# Patient Record
Sex: MALE | Race: BLACK OR AFRICAN AMERICAN | Employment: FULL TIME | URBAN - METROPOLITAN AREA
[De-identification: names, ages, dates, MRNs, and addresses within clinical notes are randomized per-mention and may not be internally consistent; named-entity substitution may affect disease eponyms.]

---

## 2020-03-04 ENCOUNTER — APPOINTMENT (OUTPATIENT)
Dept: GENERAL RADIOLOGY | Facility: CLINIC | Age: 30
End: 2020-03-04
Attending: EMERGENCY MEDICINE
Payer: COMMERCIAL

## 2020-03-04 ENCOUNTER — HOSPITAL ENCOUNTER (EMERGENCY)
Facility: CLINIC | Age: 30
Discharge: HOME | End: 2020-03-04
Attending: NURSE PRACTITIONER | Admitting: NURSE PRACTITIONER
Payer: COMMERCIAL

## 2020-03-04 VITALS
WEIGHT: 150 LBS | HEART RATE: 82 BPM | DIASTOLIC BLOOD PRESSURE: 54 MMHG | SYSTOLIC BLOOD PRESSURE: 148 MMHG | OXYGEN SATURATION: 99 % | RESPIRATION RATE: 18 BRPM | TEMPERATURE: 98.2 F | HEIGHT: 66 IN | BODY MASS INDEX: 24.11 KG/M2

## 2020-03-04 DIAGNOSIS — S62.308A CLOSED FRACTURE OF 5TH METACARPAL: ICD-10-CM

## 2020-03-04 PROCEDURE — 73130 X-RAY EXAM OF HAND: CPT | Mod: LT

## 2020-03-04 PROCEDURE — 26600 TREAT METACARPAL FRACTURE: CPT | Mod: LT

## 2020-03-04 PROCEDURE — 99284 EMERGENCY DEPT VISIT MOD MDM: CPT | Mod: 25

## 2020-03-04 ASSESSMENT — MIFFLIN-ST. JEOR: SCORE: 1588.15

## 2020-03-04 NOTE — LETTER
March 4, 2020      To Whom It May Concern:      Emiliano Winter was seen in our Emergency Department today, 03/04/20.  Emiliano will need to not use his left hand at work until seen for follow-up with orthopedics in 3-5 days and must keep the splint dry.   He may return to work when improved.    Sincerely,        Aleah Chacko, CNP

## 2020-03-04 NOTE — ED AVS SNAPSHOT
Emergency Department  [address] [place] [address]  Phone:  [phone]  Fax:  [phone]                                    Emiliano Winter   MRN: [id number]    Department:   Emergency Department   Date of Visit:  3/4/2020           After Visit Summary Signature Page    I have received my discharge instructions, and my questions have been answered. I have discussed any challenges I see with this plan with the nurse or doctor.    ..........................................................................................................................................  Patient/Patient Representative Signature      ..........................................................................................................................................  Patient Representative Print Name and Relationship to Patient    ..................................................               ................................................  Date                                   Time    ..........................................................................................................................................  Reviewed by Signature/Title    ...................................................              ..............................................  Date                                               Time          22EPIC Rev 08/18

## 2020-03-05 ASSESSMENT — ENCOUNTER SYMPTOMS
JOINT SWELLING: 1
WOUND: 0
SHORTNESS OF BREATH: 0
ARTHRALGIAS: 1
ABDOMINAL PAIN: 0
WEAKNESS: 0
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
## 2020-03-09 ENCOUNTER — ANCILLARY PROCEDURE (OUTPATIENT)
Dept: GENERAL RADIOLOGY | Facility: CLINIC | Age: 30
End: 2020-03-09
Attending: ORTHOPAEDIC SURGERY
Payer: COMMERCIAL

## 2020-03-09 ENCOUNTER — OFFICE VISIT (OUTPATIENT)
Dept: ORTHOPEDICS | Facility: CLINIC | Age: 30
End: 2020-03-09
Payer: COMMERCIAL

## 2020-03-09 ENCOUNTER — PRE VISIT (OUTPATIENT)
Dept: ORTHOPEDICS | Facility: CLINIC | Age: 30
End: 2020-03-09

## 2020-03-09 DIAGNOSIS — S62.397A OTHER FRACTURE OF FIFTH METACARPAL BONE, LEFT HAND, INITIAL ENCOUNTER FOR CLOSED FRACTURE: Primary | ICD-10-CM

## 2020-03-09 DIAGNOSIS — S62.307A: ICD-10-CM

## 2020-03-09 DIAGNOSIS — S62.307A: Primary | ICD-10-CM
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
## 2020-03-09 NOTE — TELEPHONE ENCOUNTER
DIAGNOSIS: 5th metacarpal fx seen ED   APPOINTMENT DATE: 3.9.2020   NOTES STATUS DETAILS   OFFICE NOTE from referring provider N/A    OFFICE NOTE from other specialist N/A    DISCHARGE SUMMARY from hospital N/A    DISCHARGE REPORT from the ER Internal 3.4.2020 [facility]   OPERATIVE REPORT N/A    MEDICATION LIST Internal    IMPLANT RECORD/STICKER N/A    LABS     CBC/DIFF N/A    CULTURES N/A    INJECTIONS DONE IN RADIOLOGY N/A    MRI N/A    CT SCAN N/A    XRAYS (IMAGES & REPORTS) Internal 3.4.2020 left hand   TUMOR     PATHOLOGY  Slides & report N/A
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
## 2020-03-09 NOTE — LETTER
Date:March 10, 2020      Patient was self referred, no letter generated. Do not send.        [facility] Information

## 2020-03-28 ENCOUNTER — HOSPITAL ENCOUNTER (EMERGENCY)
Facility: CLINIC | Age: 30
Discharge: HOME | End: 2020-03-28
Attending: NURSE PRACTITIONER | Admitting: NURSE PRACTITIONER
Payer: COMMERCIAL

## 2020-03-28 VITALS — RESPIRATION RATE: 16 BRPM | OXYGEN SATURATION: 98 % | TEMPERATURE: 99.7 F

## 2020-03-28 DIAGNOSIS — J02.0 STREPTOCOCCAL PHARYNGITIS: ICD-10-CM

## 2020-03-28 LAB
DEPRECATED S PYO AG THROAT QL EIA: POSITIVE
SPECIMEN SOURCE: ABNORMAL

## 2020-03-28 PROCEDURE — 87880 STREP A ASSAY W/OPTIC: CPT | Performed by: NURSE PRACTITIONER

## 2020-03-28 PROCEDURE — 99283 EMERGENCY DEPT VISIT LOW MDM: CPT

## 2020-03-28 RX ORDER — ALBUTEROL SULFATE 90 UG/1
2 AEROSOL, METERED RESPIRATORY (INHALATION)
COMMUNITY
Start: 2019-08-27

## 2020-03-28 RX ORDER — PENICILLIN V POTASSIUM 500 MG/1
500 TABLET, FILM COATED ORAL 4 TIMES DAILY
Qty: 40 TABLET | Refills: 0 | Status: SHIPPED | OUTPATIENT
Start: 2020-03-28 | End: 2020-04-07

## 2020-03-28 ASSESSMENT — ENCOUNTER SYMPTOMS
CHILLS: 0
SHORTNESS OF BREATH: 0
NECK PAIN: 0
TROUBLE SWALLOWING: 1
DYSURIA: 0
COUGH: 0
FEVER: 0
FACIAL SWELLING: 0
HEADACHES: 0
SORE THROAT: 1
VOICE CHANGE: 0
EYES NEGATIVE: 1
ABDOMINAL PAIN: 0
RHINORRHEA: 0
FATIGUE: 0
BACK PAIN: 0

## 2020-03-28 NOTE — ED AVS SNAPSHOT
Emergency Department  [address] [place] [address]  Phone:  [phone]  Fax:  [phone]                                    Emiliano Winter   MRN: [id number]    Department:   Emergency Department   Date of Visit:  3/28/2020           After Visit Summary Signature Page    I have received my discharge instructions, and my questions have been answered. I have discussed any challenges I see with this plan with the nurse or doctor.    ..........................................................................................................................................  Patient/Patient Representative Signature      ..........................................................................................................................................  Patient Representative Print Name and Relationship to Patient    ..................................................               ................................................  Date                                   Time    ..........................................................................................................................................  Reviewed by Signature/Title    ...................................................              ..............................................  Date                                               Time          22EPIC Rev 08/18

## 2020-03-28 NOTE — LETTER
March 28, 2020      To Whom It May Concern:      Emiliano Winter was seen in our Emergency Department today, 03/28/20.  Please excuse Emiliano from work on 3/28 and 3/29 due to illness. He may return to work when improved.    Sincerely,        Aleah Chacko, CNP

## 2020-03-28 NOTE — ED PROVIDER NOTES
History   Chief Complaint:  Sore Throat     HPI   Emiliano Winter is a 29 year old male who presents with sore throat.  Emiliano notes he developed a mild sore throat 3 days ago which has worsened to today.  He denies fevers but has not checked his temperature.  He states he has associated difficulty swallowing due to sore throat and cervical adenopathy.  He denies headache, nuchal rigidity, cough, shortness of breath, congestion, abdominal pain, vomiting/diarrhea.  He has taken no medication for his symptoms.  The patient works at [employer] and is unsure of his sick contacts, but does not know of exposure to patients with COVID or PUI.  No other sick family members but states his child had strep throat 2 weeks ago.     Allergies:  No known drug allergies    Medications:   Advair Diskus  Albuterol inhaler    Past Medical History:    Mild persistent asthma  Chlamydia trachomatis infection  HSV-2 infection    Past Surgical History:    History reviewed. No pertinent surgical history.    Family History:    History reviewed. No pertinent family history.     Social History:  Smoking status: Current some day smoker  Alcohol use: No    Review of Systems   Constitutional: Negative for chills, fatigue and fever.   HENT: Positive for sore throat and trouble swallowing. Negative for congestion, ear pain, facial swelling, rhinorrhea, sneezing, tinnitus and voice change.    Eyes: Negative.    Respiratory: Negative for cough and shortness of breath.    Cardiovascular: Negative for chest pain.   Gastrointestinal: Negative for abdominal pain.   Genitourinary: Negative for discharge and dysuria.   Musculoskeletal: Negative for back pain and neck pain.   Neurological: Negative for headaches.   All other systems reviewed and are negative.      Physical Exam     Patient Vitals for the past 24 hrs:   Temp Temp src Heart Rate Resp SpO2   03/28/20 1347 99.7  F (37.6  C) Oral 118 16 98 %     Physical Exam  Nursing notes reviewed. Vitals  reviewed.  General: Alert. Well kept.  Eyes:  Conjunctiva non-injected, non-icteric.  Neck/Throat: Moist mucous membranes. Normal voice. Tonsils 2+ bilateral with erythema and no exudate.  Uvula midline.  No trismus.  No nuchal rigidity. Bilateral anterior cervical adenopathy with no submandibular, submental preauricular adenopathy.  Cardiac: Regular rhythm. Normal heart sounds.  Pulmonary: Clear and equal breath sounds bilaterally.   Abdomen: soft and non-tender  Musculoskeletal: Normal gross range of motion of all 4 extremities.   Neurological: Alert and oriented x4.   Skin: Warm and dry. Normal appearance of visualized exposed skin without rashes or petechiae.  Psych: Affect normal. Good eye contact.    Emergency Department Course   Laboratory:  Laboratory findings were communicated with the patient who voiced understanding of the findings.    Strep A rapid screen with reflex to PCR: positive     Emergency Department Course:  Past medical records, nursing notes, and vitals reviewed.   1352 I performed an exam of the patient and obtained history, as documented above.    1435 I rechecked the patient. Explained findings to the patient.    Findings and plan explained to the patient. Patient discharged home with instructions regarding supportive care, medications, and reasons to return. The importance of close follow-up was reviewed.     Impression & Plan    Medical Decision Making:  Emiliano Winter is a 29 year old male who presents for evaluation of a sore throat and clinical evidence of pharyngitis.  The rapid strep test is positive. There is no clinical evidence of peritonsillar abscess, retropharyngeal abscess, Lemierre's Syndrome, epiglottis, or Han's angina. The patient's symptoms are consistent with streptococcal pharyngitis.  No exposure or symptoms consistent with COVID infection. I have recommended treatment with antibiotics and analgesics.  Return if increasing pain, change in voice, neck pain, vomiting,  fever, or shortness of breath. Follow-up with primary physician if not improving in 3-5 days.    Diagnosis:    ICD-10-CM   1. Streptococcal pharyngitis  J02.0      Disposition:  Discharged to home.    Discharge Medications:  New Prescriptions    PENICILLIN V (VEETID) 500 MG TABLET    Take 1 tablet (500 mg) by mouth 4 times daily for 10 days       3/28/2020   Sandeep Mancera I, Sandeep Mancera, am serving as a scribe at 1:45 PM on 3/28/2020 to document services personally performed by Aleah Chacko CNP based on my observations and the provider's statements to me.      Aleah Chacko CNP  03/28/20 150

## 2020-08-30 ENCOUNTER — HOSPITAL ENCOUNTER (EMERGENCY)
Facility: CLINIC | Age: 30
Discharge: HOME | End: 2020-08-30
Attending: EMERGENCY MEDICINE | Admitting: EMERGENCY MEDICINE
Payer: COMMERCIAL

## 2020-08-30 ENCOUNTER — APPOINTMENT (OUTPATIENT)
Dept: GENERAL RADIOLOGY | Facility: CLINIC | Age: 30
End: 2020-08-30
Attending: EMERGENCY MEDICINE
Payer: COMMERCIAL

## 2020-08-30 VITALS
HEART RATE: 88 BPM | SYSTOLIC BLOOD PRESSURE: 124 MMHG | OXYGEN SATURATION: 97 % | TEMPERATURE: 98.1 F | DIASTOLIC BLOOD PRESSURE: 91 MMHG

## 2020-08-30 DIAGNOSIS — S51.011A: ICD-10-CM

## 2020-08-30 PROCEDURE — 73060 X-RAY EXAM OF HUMERUS: CPT | Mod: RT

## 2020-08-30 PROCEDURE — 73090 X-RAY EXAM OF FOREARM: CPT | Mod: RT

## 2020-08-30 PROCEDURE — 99284 EMERGENCY DEPT VISIT MOD MDM: CPT

## 2020-08-30 PROCEDURE — 12001 RPR S/N/AX/GEN/TRNK 2.5CM/<: CPT

## 2020-08-30 ASSESSMENT — ENCOUNTER SYMPTOMS: WOUND: 1

## 2020-08-30 NOTE — ED AVS SNAPSHOT
Emergency Department  [address] [place] [id number]  Phone:  [phone]  Fax:  [phone]                                    Emiliano Winter   MRN: [id number]    Department:   Emergency Department   Date of Visit:  8/30/2020           After Visit Summary Signature Page    I have received my discharge instructions, and my questions have been answered. I have discussed any challenges I see with this plan with the nurse or doctor.    ..........................................................................................................................................  Patient/Patient Representative Signature      ..........................................................................................................................................  Patient Representative Print Name and Relationship to Patient    ..................................................               ................................................  Date                                   Time    ..........................................................................................................................................  Reviewed by Signature/Title    ...................................................              ..............................................  Date                                               Time          22EPIC Rev 08/18
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
